# Patient Record
Sex: FEMALE | Race: WHITE | NOT HISPANIC OR LATINO | Employment: UNEMPLOYED | URBAN - METROPOLITAN AREA
[De-identification: names, ages, dates, MRNs, and addresses within clinical notes are randomized per-mention and may not be internally consistent; named-entity substitution may affect disease eponyms.]

---

## 2024-05-23 ENCOUNTER — APPOINTMENT (OUTPATIENT)
Dept: RADIOLOGY | Facility: HOSPITAL | Age: 54
End: 2024-05-23
Payer: COMMERCIAL

## 2024-05-23 PROCEDURE — 99284 EMERGENCY DEPT VISIT MOD MDM: CPT | Mod: 25

## 2024-05-23 PROCEDURE — 73110 X-RAY EXAM OF WRIST: CPT | Mod: LT

## 2024-05-23 PROCEDURE — 25605 CLTX DST RDL FX/EPHYS SEP W/: CPT | Performed by: EMERGENCY MEDICINE

## 2024-05-23 PROCEDURE — 73110 X-RAY EXAM OF WRIST: CPT | Mod: LEFT SIDE | Performed by: RADIOLOGY

## 2024-05-23 ASSESSMENT — COLUMBIA-SUICIDE SEVERITY RATING SCALE - C-SSRS
2. HAVE YOU ACTUALLY HAD ANY THOUGHTS OF KILLING YOURSELF?: NO
1. IN THE PAST MONTH, HAVE YOU WISHED YOU WERE DEAD OR WISHED YOU COULD GO TO SLEEP AND NOT WAKE UP?: NO
6. HAVE YOU EVER DONE ANYTHING, STARTED TO DO ANYTHING, OR PREPARED TO DO ANYTHING TO END YOUR LIFE?: NO

## 2024-05-23 ASSESSMENT — PAIN DESCRIPTION - LOCATION: LOCATION: WRIST

## 2024-05-23 ASSESSMENT — PAIN DESCRIPTION - ORIENTATION: ORIENTATION: LEFT

## 2024-05-23 ASSESSMENT — PAIN - FUNCTIONAL ASSESSMENT: PAIN_FUNCTIONAL_ASSESSMENT: 0-10

## 2024-05-23 ASSESSMENT — PAIN SCALES - GENERAL: PAINLEVEL_OUTOF10: 10 - WORST POSSIBLE PAIN

## 2024-05-24 ENCOUNTER — APPOINTMENT (OUTPATIENT)
Dept: ORTHOPEDIC SURGERY | Facility: HOSPITAL | Age: 54
End: 2024-05-24
Payer: COMMERCIAL

## 2024-05-24 ENCOUNTER — APPOINTMENT (OUTPATIENT)
Dept: RADIOLOGY | Facility: HOSPITAL | Age: 54
End: 2024-05-24
Payer: COMMERCIAL

## 2024-05-24 ENCOUNTER — HOSPITAL ENCOUNTER (EMERGENCY)
Facility: HOSPITAL | Age: 54
Discharge: HOME | End: 2024-05-24
Attending: EMERGENCY MEDICINE
Payer: COMMERCIAL

## 2024-05-24 VITALS
HEART RATE: 62 BPM | TEMPERATURE: 98.6 F | SYSTOLIC BLOOD PRESSURE: 97 MMHG | WEIGHT: 98 LBS | HEIGHT: 67 IN | DIASTOLIC BLOOD PRESSURE: 67 MMHG | OXYGEN SATURATION: 96 % | BODY MASS INDEX: 15.38 KG/M2 | RESPIRATION RATE: 16 BRPM

## 2024-05-24 DIAGNOSIS — S52.532A CLOSED COLLES' FRACTURE OF LEFT RADIUS, INITIAL ENCOUNTER: Primary | ICD-10-CM

## 2024-05-24 PROBLEM — F17.200 NICOTINE DEPENDENCE, UNSPECIFIED, UNCOMPLICATED: Status: ACTIVE | Noted: 2021-09-22

## 2024-05-24 PROBLEM — K63.2 ENTEROCUTANEOUS FISTULA: Status: ACTIVE | Noted: 2018-05-03

## 2024-05-24 PROBLEM — R79.89 ABNORMAL BLOOD CREATININE LEVEL: Status: ACTIVE | Noted: 2019-07-25

## 2024-05-24 PROBLEM — F33.9 MAJOR DEPRESSIVE DISORDER, RECURRENT, UNSPECIFIED (CMS-HCC): Chronic | Status: ACTIVE | Noted: 2021-09-22

## 2024-05-24 PROBLEM — N28.1 BILATERAL RENAL CYSTS: Status: ACTIVE | Noted: 2019-07-25

## 2024-05-24 PROBLEM — N80.00 UTERUS, ADENOMYOSIS: Status: ACTIVE | Noted: 2017-10-03

## 2024-05-24 PROBLEM — R19.5 POSITIVE FIT (FECAL IMMUNOCHEMICAL TEST): Status: ACTIVE | Noted: 2022-02-01

## 2024-05-24 PROBLEM — G43.909 MIGRAINE WITHOUT STATUS MIGRAINOSUS, NOT INTRACTABLE: Status: ACTIVE | Noted: 2019-07-25

## 2024-05-24 PROBLEM — F11.20 OPIOID DEPENDENCE ON AGONIST THERAPY (MULTI): Chronic | Status: ACTIVE | Noted: 2019-07-25

## 2024-05-24 PROBLEM — Z93.4: Status: ACTIVE | Noted: 2017-10-03

## 2024-05-24 PROBLEM — F17.200 SMOKING: Status: ACTIVE | Noted: 2018-10-15

## 2024-05-24 PROCEDURE — 2500000004 HC RX 250 GENERAL PHARMACY W/ HCPCS (ALT 636 FOR OP/ED): Mod: JZ | Performed by: PEDIATRICS

## 2024-05-24 PROCEDURE — 96372 THER/PROPH/DIAG INJ SC/IM: CPT | Performed by: PHYSICIAN ASSISTANT

## 2024-05-24 PROCEDURE — 73110 X-RAY EXAM OF WRIST: CPT | Mod: LT,76

## 2024-05-24 PROCEDURE — 2500000004 HC RX 250 GENERAL PHARMACY W/ HCPCS (ALT 636 FOR OP/ED): Performed by: PHYSICIAN ASSISTANT

## 2024-05-24 RX ORDER — KETOROLAC TROMETHAMINE 30 MG/ML
15 INJECTION, SOLUTION INTRAMUSCULAR; INTRAVENOUS ONCE
Status: COMPLETED | OUTPATIENT
Start: 2024-05-24 | End: 2024-05-24

## 2024-05-24 RX ORDER — BUPIVACAINE HYDROCHLORIDE 5 MG/ML
10 INJECTION, SOLUTION PERINEURAL ONCE
Status: DISCONTINUED | OUTPATIENT
Start: 2024-05-24 | End: 2024-05-24

## 2024-05-24 RX ORDER — HYDROMORPHONE HYDROCHLORIDE 1 MG/ML
1 INJECTION, SOLUTION INTRAMUSCULAR; INTRAVENOUS; SUBCUTANEOUS ONCE
Status: COMPLETED | OUTPATIENT
Start: 2024-05-24 | End: 2024-05-24

## 2024-05-24 RX ORDER — BUPIVACAINE HYDROCHLORIDE 5 MG/ML
10 INJECTION, SOLUTION EPIDURAL; INTRACAUDAL ONCE
Status: COMPLETED | OUTPATIENT
Start: 2024-05-24 | End: 2024-05-24

## 2024-05-24 RX ADMIN — BUPIVACAINE HYDROCHLORIDE 50 MG: 5 INJECTION, SOLUTION EPIDURAL; INTRACAUDAL; PERINEURAL at 02:45

## 2024-05-24 RX ADMIN — KETOROLAC TROMETHAMINE 15 MG: 30 INJECTION, SOLUTION INTRAMUSCULAR at 02:33

## 2024-05-24 RX ADMIN — HYDROMORPHONE HYDROCHLORIDE 1 MG: 1 INJECTION, SOLUTION INTRAMUSCULAR; INTRAVENOUS; SUBCUTANEOUS at 02:32

## 2024-05-24 ASSESSMENT — PAIN SCALES - GENERAL
PAINLEVEL_OUTOF10: 8
PAINLEVEL_OUTOF10: 10 - WORST POSSIBLE PAIN
PAINLEVEL_OUTOF10: 5 - MODERATE PAIN

## 2024-05-24 ASSESSMENT — PAIN - FUNCTIONAL ASSESSMENT: PAIN_FUNCTIONAL_ASSESSMENT: 0-10

## 2024-05-24 NOTE — ED PROVIDER NOTES
"Chief Complaint   Patient presents with    Wrist Injury     L wrist injury, fell at bottom of stairs, tried to catch self but ended up injuring L wrist     HPI:   Chloé Rojas is an 53 y.o. female with complicated PMH including ARNOLDO, bipolar, MDD, migraine disorder, fibromyalgia, gastroparesis presents to the ED for evaluation of left wrist pain.  Patient says she was going downstairs to do the laundry and there was a bird in her house.  She attempted to swat added with a towel in her hand and ended up slipping on a chair and falling into a concrete floor with her left wrist outstretched.  She did not hit her head or lose consciousness.  She is not on anticoagulants.  She is right-hand dominant.  She reports that she felt \"immediately warm pain\" radiating up her left wrist and arm.  Reports pain is 10/10.  She tried icing it without relief.  She denies any numbness, tingling, extremity weakness.  She is not on anticoagulants.  Denies any head pain, neck pain, back pain, dizziness or lightheadedness, chest pain, shortness of breath.    Medications: Keppra, Suboxone, Zofran, BuSpar, trazodone,, pending  Soc HX: Daily tobacco use, marijuana use  No Known Allergies: NKDA  History reviewed. No pertinent past medical history.  History reviewed. No pertinent surgical history.  No family history on file.     Physical Exam  Vitals and nursing note reviewed.   Constitutional:       General: She is not in acute distress.     Appearance: She is not ill-appearing or toxic-appearing.      Comments: Tearful, cachectic.   HENT:      Right Ear: External ear normal.      Left Ear: External ear normal.   Eyes:      Pupils: Pupils are equal, round, and reactive to light.   Cardiovascular:      Rate and Rhythm: Normal rate and regular rhythm.      Pulses: Normal pulses.      Heart sounds: Normal heart sounds.   Pulmonary:      Effort: Pulmonary effort is normal.      Breath sounds: Normal breath sounds.   Musculoskeletal:      Cervical " back: Normal range of motion. No tenderness.      Comments: L UE: Deformity of left wrist.  2+ radial pulse.  Normal cap refill.  Normal active ROM of digits.  No tenderness with palpation of proximal forearm.  Normal ROM of elbow and shoulder.   Skin:     General: Skin is warm and dry.      Capillary Refill: Capillary refill takes less than 2 seconds.   Neurological:      General: No focal deficit present.      Mental Status: She is alert.      Cranial Nerves: No cranial nerve deficit.      Sensory: No sensory deficit.     VS: As documented in the triage note and EMR flowsheet from this visit were reviewed.      Medical Decision Making:   ED Course as of 05/26/24 0122   Fri May 24, 2024   0016 Vitals Reviewed: Afebrile. Normotensive. Tachycardic and tachypneic. No hypoxia.   [KA]   0018 Sent secure images of x-rays to orthopedist on-call.  Would like input as to whether we need to attempt reduction or whether patient is safe to be placed in splint and follow-up on outpatient basis with Ortho. [KA]   0118 Patient is 53-year-old female presents to the ED for evaluation of left wrist pain after sustaining a fall on outstretched arm.  She has obvious deformity of the left wrist.  [place] head CT and [facility] do not recommend imaging.  Reviewed x-ray imaging and there is displaced, impacted fracture of the distal radius and appears to be a avulsion fracture of the distal ulna.  I already sent images to the orthopedist on-call and he recommends reduction.  Patient to be given IM Dilaudid and IM Toradol.  Will undergo reduction and splinting. [KA]   0325 Patient underwent reduction and splinting by COREEN Rosa.  She tolerated well.  I reviewed postreduction images and appears to be in better anatomic alignment. [KA]   0500 Provided reassurance to patient.  Recommended follow-up with orthopedics.  Encouraged to continue Tylenol and/or ibuprofen as needed for pain.  Encouraged to return to the ER for any new or  worsening pain, numbness, tingling, finger discoloration.  Patient agreeable. [KA]      ED Course User Index  [KA] Annemarie Doll PA-C         Diagnoses as of 05/26/24 0122   Closed Colles' fracture of left radius, initial encounter     Procedures      Escalation of Care: Appropriate for outpatient management       Discussion of Management with Other Providers:  I discussed the patient/results with: Attending [facility]    Counseling: Spoke with the patient and discussed today´s findings, in addition to providing specific details for the plan of care and expected course.  Patient was given the opportunity to ask questions.    Discussed return precautions and importance of follow-up.  Advised to follow-up with orthopedics.  Advised to return to the ED for changing or worsening symptoms, new symptoms, complaint specific precautions, and precautions listed on the discharge paperwork.  Educated on the common potential side effects of medications prescribed.    I advised the patient that the emergency evaluation and treatment provided today doesn't end their need for medical care. It is very important that they follow-up with their primary care provider or other specialist as instructed.    The plan of care was mutually agreed upon with the patient. The patient and/or family were given the opportunity to ask questions. All questions asked today in the ED were answered to the best of my ability with today's information.    I specifically advised the patient to return to the ED for changing or worsening symptoms, worrisome new symptoms, or for any complaint specific precautions listed on the discharge paperwork.    This patient was cared for in the setting of nationwide stress on resources and staffing.    This report was transcribed using voice recognition software.  Every effort was made to ensure accuracy, however, inadvertently computerized transcription errors may be present.       Annemarie Doll PA-C  05/26/24  [id number]

## 2024-05-24 NOTE — DISCHARGE INSTRUCTIONS
Please continue Tylenol and/or ibuprofen as needed for pain.  Follow-up with orthopedics.  If you cannot get into see orthopedic doctor in a timely fashion may present to orthopedic injury clinic for further evaluation.  Return to ER for any new or worsening symptoms.     orthopedic injury clinic   [facility] [place]  [address]  Second floor [place] [place] [address]  [phone]  Open for walk-ins Monday through Friday 8:30 AM through 4 PM  open 10 AM to 2 PM Christmas liberty and New Year's liberty  Closed on Jake day and New Year's day

## 2024-05-27 NOTE — ED PROCEDURE NOTE
Procedure  Orthopaedic Injury Treatment - Fracture    Performed by: Andrés Rosa MD  Authorized by: Andrés Rosa MD    Consent:     Consent obtained:  Verbal    Consent given by:  Patient    Risks, benefits, and alternatives were discussed: yes      Risks discussed:  Pain    Alternatives discussed:  Delayed treatment, observation and referral  Universal protocol:     Procedure explained and questions answered to patient or proxy's satisfaction: yes      Test results available: yes      Required blood products, implants, devices, and special equipment available: yes      Immediately prior to procedure, a time out was called: yes      Patient identity confirmed:  Verbally with patient  Injury:     Injury location:  Forearm    Forearm injury location:  L forearm    Forearm fracture type: distal radius and ulnar styloid    Pre-procedure details:     Distal neurologic exam:  Normal    Distal perfusion: distal pulses strong      Range of motion: reduced    Sedation:     Sedation type:  None  Anesthesia:     Anesthesia method:  Local infiltration    Local anesthetic:  Bupivacaine 0.5% w/o epi  Procedure details:     Manipulation performed: yes      Skin traction used: yes      Pin inserted: no      Reduction successful: yes      X-ray confirmed reduction: yes      Immobilization:  Splint    Splint type:  Thumb spica    Supplies used:  Elastic bandage, cotton padding and fiberglass    Attestation: Splint applied and adjusted personally by me    Post-procedure details:     Distal neurologic exam:  Normal    Distal perfusion: distal pulses strong      Range of motion: not applicable      Procedure completion:  Tolerated               Andrés Rosa MD  05/26/24 [id number]

## 2024-05-31 ENCOUNTER — OFFICE VISIT (OUTPATIENT)
Dept: ORTHOPEDIC SURGERY | Facility: HOSPITAL | Age: 54
End: 2024-05-31
Payer: COMMERCIAL

## 2024-05-31 DIAGNOSIS — S52.602A TRAUMATIC CLOSED DISPLACED FRACTURE OF DISTAL END OF LEFT RADIUS AND ULNA, INITIAL ENCOUNTER: Primary | ICD-10-CM

## 2024-05-31 DIAGNOSIS — S52.502A TRAUMATIC CLOSED DISPLACED FRACTURE OF DISTAL END OF LEFT RADIUS AND ULNA, INITIAL ENCOUNTER: Primary | ICD-10-CM

## 2024-05-31 PROCEDURE — 1036F TOBACCO NON-USER: CPT | Performed by: ORTHOPAEDIC SURGERY

## 2024-05-31 PROCEDURE — 99214 OFFICE O/P EST MOD 30 MIN: CPT | Performed by: ORTHOPAEDIC SURGERY

## 2024-05-31 PROCEDURE — 29075 APPL CST ELBW FNGR SHORT ARM: CPT | Performed by: ORTHOPAEDIC SURGERY

## 2024-05-31 PROCEDURE — 99204 OFFICE O/P NEW MOD 45 MIN: CPT | Performed by: ORTHOPAEDIC SURGERY

## 2024-05-31 NOTE — PROGRESS NOTES
CHIEF COMPLAINT         Left wrist pain    ASSESSMENT + PLAN    Extra-articular left distal radius Colles' fracture reduced from 20 degrees to 0 degrees dorsal tilt with large associated ulnar styloid fracture involving the fovea    I reviewed the nature of the injury and the expected time course of healing from here along with the expectation of mild cosmetic deformity at the wrist.  I discussed the relative trade-offs between continued nonoperative management and excepting the deformity versus going to the OR for surgical reduction and fixation.  We agreed on the nonoperative route and a fiberglass short arm cast was applied today, in the safe position.  Keep this clean, dry, and in place for the next 5 weeks.  Follow-up at that point for cast removal with new AP, lateral, oblique x-rays left wrist out of cast.    Contact my office in the meantime with any interval concerns.        HISTORY OF PRESENT ILLNESS       Patient is a 53 y.o. right-hand dominant female, who presents today for evaluation of a left wrist fracture.  This occurred on April 23 when she fell while taking out the trash, landing on outstretched left wrist.  She had immediate pain, swelling, deformity and went to [facility] where a distal radius fracture was identified, closed reduced, and splinted.  She presents today for first orthopedic evaluation.  Pain decreasing appropriately.  No numbness or tingling.  She does not have a history of significant injury to this wrist in the past.    She is not diabetic or hypothyroid.  She does smoke up to a pack a day.  She has history of fibromyalgia, bipolar, MDD.      REVIEW OF SYSTEMS       A 30-item multi-system Review Of Systems was obtained on today's intake form.  This was reviewed with the patient and is correct.  The pertinent positives and negatives are listed above.  The form has been scanned separately into the medical record.      PHYSICAL EXAM    Constitutional:    Appears stated age.  Well-developed and well-nourished morbidly obese female in no acute distress.  Psychiatric:         Pleasant normal mood and affect. Behavior is appropriate for the situation.   Head:                   Normocephalic and atraumatic.  Eyes:                    Pupils are equal and round.  Cardiovascular:  2+ radial and ulnar pulses. Fingers well-perfused.  Respiratory:        Effort normal. No respiratory distress. Speaking in complete sentences.  Neurologic:       Alert and oriented to person, place, and time.  Skin:                Skin is intact, warm and dry.  Hematologic / Lymphatic:    No lymphedema or lymphangitis.    Extremities / Musculoskeletal:                      After reviewing the films and discussing the options, the splint was removed.  Skin intact.  Moderate palmar ecchymosis and mild dorsal swelling.  Minimal dinner fork deformity.  Good composite finger flexion extension with intact intrinsic plus minus posture.  Tendons intact by individual testing.  Sensation intact to light touch in all distributions.  Capillary refill less than 2 seconds.      IMAGING / LABS / EMGs           X-rays left wrist from May 23 and 24 at [facility] were independently interpreted by me today.  There is an extra-articular left distal radius fracture in 20 degrees dorsal tilt with subsequent reduction to neutral tilt.  Good maintenance of radial height and length.  Large associated ulnar styloid fracture involving part of the fovea.      No past medical history on file.    Medication Documentation Review Audit       Reviewed by Annemarie Doll PA-C (Physician Assistant) on 05/24/24 at 0019      Medication Order Taking? Sig Documenting Provider Last Dose Status            No Medications to Display                                   No Known Allergies    Social History     Socioeconomic History    Marital status:      Spouse name: Not on file    Number of children: Not on file    Years of education: Not on file    Highest  education level: Not on file   Occupational History    Not on file   Tobacco Use    Smoking status: Not on file    Smokeless tobacco: Not on file   Substance and Sexual Activity    Alcohol use: Not on file    Drug use: Not on file    Sexual activity: Not on file   Other Topics Concern    Not on file   Social History Narrative    Not on file     Social Determinants of Health     Financial Resource Strain: Low Risk  (1/16/2023)    Received from [facility]    Overall Financial Resource Strain (CARDIA)     Difficulty of Paying Living Expenses: Not very hard   Food Insecurity: Food Insecurity Present (1/16/2023)    Received from [facility]    Hunger Vital Sign     Worried About Running Out of Food in the Last Year: Sometimes true     Ran Out of Food in the Last Year: Sometimes true   Transportation Needs: Unmet Transportation Needs (1/16/2023)    Received from [facility] [facility]    PRAPARE - Transportation     Lack of Transportation (Medical): Yes     Lack of Transportation (Non-Medical): Yes   Physical Activity: Inactive (1/16/2023)    Received from [facility] [facility]    Exercise Vital Sign     Days of Exercise per Week: 0 days     Minutes of Exercise per Session: 0 min   Stress: Stress Concern Present (1/16/2023)    Received from [facility]    [employer] [place] [employer] - Occupational Stress Questionnaire     Feeling of Stress : Rather much   Social Connections: Moderately Integrated (1/16/2023)    Received from [facility]    Social Connection and Isolation Panel [NHANES]     Frequency of Communication with Friends and Family: More than three times a week     Frequency of Social Gatherings with Friends and Family: Twice a week     Attends Druze Services: 1 to 4 times per year     Active Member of Clubs or Organizations: Yes     Attends Club or Organization Meetings: More than 4 times per year     Marital Status:    Intimate Partner Violence: Not At Risk (1/16/2023)    Received from [employer] [facility]     Humiliation, Afraid, Rape, and Kick questionnaire     Fear of Current or Ex-Partner: No     Emotionally Abused: No     Physically Abused: No     Sexually Abused: No   Housing Stability: Low Risk  (1/16/2023)    Received from [facility] [place]    Housing Stability Vital Sign     Unable to Pay for Housing in the Last Year: No     Number of Places Lived in the Last Year: 1     Unstable Housing in the Last Year: No       No past surgical history on file.      Electronically Signed      JAIMIE Leavitt MD      Orthopaedic Hand Surgery      [phone]

## 2024-05-31 NOTE — LETTER
Falguni 3, 2024       No Recipients    Patient: Chloé Rojas   YOB: 1970   Date of Visit: 5/31/2024       Dear Dr. Galvan Recipients:    Thank you for referring Chloé Rojas to me for evaluation. Below are my notes for this consultation.  If you have questions, please do not hesitate to call me. I look forward to following your patient along with you.       Sincerely,     Jasmeet Leavitt MD      CC:   No Recipients  ______________________________________________________________________________________    CHIEF COMPLAINT         Left wrist pain    ASSESSMENT + PLAN    Extra-articular left distal radius Colles' fracture reduced from 20 degrees to 0 degrees dorsal tilt with large associated ulnar styloid fracture involving the fovea    I reviewed the nature of the injury and the expected time course of healing from here along with the expectation of mild cosmetic deformity at the wrist.  I discussed the relative trade-offs between continued nonoperative management and excepting the deformity versus going to the OR for surgical reduction and fixation.  We agreed on the nonoperative route and a fiberglass short arm cast was applied today, in the safe position.  Keep this clean, dry, and in place for the next 5 weeks.  Follow-up at that point for cast removal with new AP, lateral, oblique x-rays left wrist out of cast.    Contact my office in the meantime with any interval concerns.        HISTORY OF PRESENT ILLNESS       Patient is a 53 y.o. right-hand dominant female, who presents today for evaluation of a left wrist fracture.  This occurred on April 23 when she fell while taking out the trash, landing on outstretched left wrist.  She had immediate pain, swelling, deformity and went to [facility] where a distal radius fracture was identified, closed reduced, and splinted.  She presents today for first orthopedic evaluation.  Pain decreasing appropriately.  No numbness or tingling.  She does not have a history  of significant injury to this wrist in the past.    She is not diabetic or hypothyroid.  She does smoke up to a pack a day.  She has history of fibromyalgia, bipolar, MDD.      REVIEW OF SYSTEMS       A 30-item multi-system Review Of Systems was obtained on today's intake form.  This was reviewed with the patient and is correct.  The pertinent positives and negatives are listed above.  The form has been scanned separately into the medical record.      PHYSICAL EXAM    Constitutional:    Appears stated age. Well-developed and well-nourished morbidly obese female in no acute distress.  Psychiatric:         Pleasant normal mood and affect. Behavior is appropriate for the situation.   Head:                   Normocephalic and atraumatic.  Eyes:                    Pupils are equal and round.  Cardiovascular:  2+ radial and ulnar pulses. Fingers well-perfused.  Respiratory:        Effort normal. No respiratory distress. Speaking in complete sentences.  Neurologic:       Alert and oriented to person, place, and time.  Skin:                Skin is intact, warm and dry.  Hematologic / Lymphatic:    No lymphedema or lymphangitis.    Extremities / Musculoskeletal:                      After reviewing the films and discussing the options, the splint was removed.  Skin intact.  Moderate palmar ecchymosis and mild dorsal swelling.  Minimal dinner fork deformity.  Good composite finger flexion extension with intact intrinsic plus minus posture.  Tendons intact by individual testing.  Sensation intact to light touch in all distributions.  Capillary refill less than 2 seconds.      IMAGING / LABS / EMGs           X-rays left wrist from May 23 and 24 at [facility] were independently interpreted by me today.  There is an extra-articular left distal radius fracture in 20 degrees dorsal tilt with subsequent reduction to neutral tilt.  Good maintenance of radial height and length.  Large associated ulnar styloid fracture involving part of the  fovea.      No past medical history on file.    Medication Documentation Review Audit       Reviewed by Annemarie Doll PA-C (Physician Assistant) on 05/24/24 at 0019      Medication Order Taking? Sig Documenting Provider Last Dose Status            No Medications to Display                                   No Known Allergies    Social History     Socioeconomic History   • Marital status:      Spouse name: Not on file   • Number of children: Not on file   • Years of education: Not on file   • Highest education level: Not on file   Occupational History   • Not on file   Tobacco Use   • Smoking status: Not on file   • Smokeless tobacco: Not on file   Substance and Sexual Activity   • Alcohol use: Not on file   • Drug use: Not on file   • Sexual activity: Not on file   Other Topics Concern   • Not on file   Social History Narrative   • Not on file     Social Determinants of Health     Financial Resource Strain: Low Risk  (1/16/2023)    Received from [employer]    Overall Financial Resource Strain (CARDIA)    • Difficulty of Paying Living Expenses: Not very hard   Food Insecurity: Food Insecurity Present (1/16/2023)    Received from [employer]    Hunger Vital Sign    • Worried About Running Out of Food in the Last Year: Sometimes true    • Ran Out of Food in the Last Year: Sometimes true   Transportation Needs: Unmet Transportation Needs (1/16/2023)    Received from [employer]    PRAPARE - Transportation    • Lack of Transportation (Medical): Yes    • Lack of Transportation (Non-Medical): Yes   Physical Activity: Inactive (1/16/2023)    Received from [facility]    Exercise Vital Sign    • Days of Exercise per Week: 0 days    • Minutes of Exercise per Session: 0 min   Stress: Stress Concern Present (1/16/2023)    Received from [facility]    [employer] [place] [employer] - Occupational Stress Questionnaire    • Feeling of Stress : Rather much   Social Connections: Moderately Integrated  (1/16/2023)    Received from [employer]    Social Connection and Isolation Panel [NHANES]    • Frequency of Communication with Friends and Family: More than three times a week    • Frequency of Social Gatherings with Friends and Family: Twice a week    • Attends Tenriism Services: 1 to 4 times per year    • Active Member of Clubs or Organizations: Yes    • Attends Club or Organization Meetings: More than 4 times per year    • Marital Status:    Intimate Partner Violence: Not At Risk (1/16/2023)    Received from [employer]    Humiliation, Afraid, Rape, and Kick questionnaire    • Fear of Current or Ex-Partner: No    • Emotionally Abused: No    • Physically Abused: No    • Sexually Abused: No   Housing Stability: Low Risk  (1/16/2023)    Received from [employer]    Housing Stability Vital Sign    • Unable to Pay for Housing in the Last Year: No    • Number of Places Lived in the Last Year: 1    • Unstable Housing in the Last Year: No       No past surgical history on file.      Electronically Signed      JAIMIE Leavitt MD      Orthopaedic Hand Surgery      [phone]

## 2024-06-03 PROBLEM — S52.602A TRAUMATIC CLOSED DISPLACED FRACTURE OF DISTAL END OF LEFT RADIUS AND ULNA: Status: ACTIVE | Noted: 2024-06-03

## 2024-06-03 PROBLEM — S52.502A TRAUMATIC CLOSED DISPLACED FRACTURE OF DISTAL END OF LEFT RADIUS AND ULNA: Status: ACTIVE | Noted: 2024-06-03

## 2024-07-03 ENCOUNTER — APPOINTMENT (OUTPATIENT)
Dept: RADIOLOGY | Facility: HOSPITAL | Age: 54
End: 2024-07-03
Payer: COMMERCIAL

## 2024-07-12 ENCOUNTER — OFFICE VISIT (OUTPATIENT)
Dept: ORTHOPEDIC SURGERY | Facility: HOSPITAL | Age: 54
End: 2024-07-12
Payer: COMMERCIAL

## 2024-07-12 ENCOUNTER — HOSPITAL ENCOUNTER (OUTPATIENT)
Dept: RADIOLOGY | Facility: HOSPITAL | Age: 54
Discharge: HOME | End: 2024-07-12
Payer: COMMERCIAL

## 2024-07-12 DIAGNOSIS — S52.502P: ICD-10-CM

## 2024-07-12 DIAGNOSIS — S52.502D TRAUMATIC CLOSED DISPLACED FRACTURE OF DISTAL END OF LEFT RADIUS AND ULNA WITH ROUTINE HEALING, SUBSEQUENT ENCOUNTER: ICD-10-CM

## 2024-07-12 DIAGNOSIS — S52.602D TRAUMATIC CLOSED DISPLACED FRACTURE OF DISTAL END OF LEFT RADIUS AND ULNA WITH ROUTINE HEALING, SUBSEQUENT ENCOUNTER: ICD-10-CM

## 2024-07-12 PROCEDURE — 73110 X-RAY EXAM OF WRIST: CPT | Mod: LT

## 2024-07-12 PROCEDURE — 99214 OFFICE O/P EST MOD 30 MIN: CPT | Performed by: ORTHOPAEDIC SURGERY

## 2024-07-12 PROCEDURE — 1036F TOBACCO NON-USER: CPT | Performed by: ORTHOPAEDIC SURGERY

## 2024-07-12 NOTE — PROGRESS NOTES
CHIEF COMPLAINT         Left wrist f/u    ASSESSMENT + PLAN    Left distal radius impending 40 degree malunion    Unfortunately, the fracture has settled back in a cast and to the current position.  I pointed out how the angulation of the bone is producing the visible deformity of the wrist.  We discussed options for management and agreed on proceeding with surgical osteotomy and volar plating.  I reviewed the major risks of that procedure.  This would be done under general anesthetic and a block at the location of your convenience.  Contact my office to set up an exact surgical date.    A new volar removable wrist slab splint was provided for comfort in the meantime.    HISTORY OF PRESENT ILLNESS       Patient returns today, as directed, 5 weeks after last visit in a cast for a left extra-articular distal radius fracture reduced from 20 degrees to 0 degrees of dorsal tilt.  She reports appropriately decreasing pain and no numbness or tingling.  No interval trauma.      PHYSICAL EXAM       She remains well-developed, quite thin female in no acute distress.  She appears her stated age and has a pleasant affect.  Splint removed.  Skin intact.  Significant dinner fork deformity of the wrist.  No tenderness at the fracture site.  Moderate prominence of the ulnar head as well.  Full composite finger flexion extension and good thumb opposition to station 9.  Sensation intact to light touch in all distributions.  Capillary refill less than 2 seconds.  2+ radial and ulnar pulses.      IMAGING / LABS / EMGs    X-rays left wrist ordered and independently interpreted by me today confirm a little longitudinal collapse and significant dorsal tilt collapse of the distal radius, now measuring about 40 degrees and leaving her about 4 mm ulnar positive.  Joints are otherwise concentric and well-preserved.  Good callus formation at the fracture.    SURGICAL INDICATION    I reviewed the options for further management of this condition  and the likely success rates of each.  The patient feels that they have maximized the benefits of conservative care, and they do want to go on to surgery.    I reviewed the major risks of surgery including infection; scarring; damage to nerves, tendons, or vessels; stiffness; nonunion, malunion, hardware site issues, progressive arthritis, and wound healing problems, as well as anesthesia risks.  I answered their questions to their satisfaction.  They were given my contact information and will contact the office when they are ready to schedule an exact surgical date.  Surgery will be posted as follows :    Dx :          Left distal radius extra-articular malunion  ICD-10 :      s52.502p  Procedure :     Corrective osteotomy and volar plating of left distal radius extra-articular malunion  CPT :        35914  Anesth :    General plus block  [facility] :   [facility]  Duration :    2 hours  Specials :    Synthes variable angle distal radius set, mini C arm  PAT :       No  Post-Op Visit :    10-15 days        Electronically Signed      JAIMIE Leavitt MD      Orthopaedic Hand Surgery      [phone]

## 2024-07-12 NOTE — LETTER
July 15, 2024     No Recipients    Patient: Chloé Rojas   YOB: 1970   Date of Visit: 7/12/2024       Dear Dr. Galvan Recipients:    Thank you for referring Chloé Rojas to me for evaluation. Below are my notes for this consultation.  If you have questions, please do not hesitate to call me. I look forward to following your patient along with you.       Sincerely,     Jasmeet Leavitt MD      CC: No Recipients  ______________________________________________________________________________________    CHIEF COMPLAINT         Left wrist f/u    ASSESSMENT + PLAN    Left distal radius impending 40 degree malunion    Unfortunately, the fracture has settled back in a cast and to the current position.  I pointed out how the angulation of the bone is producing the visible deformity of the wrist.  We discussed options for management and agreed on proceeding with surgical osteotomy and volar plating.  I reviewed the major risks of that procedure.  This would be done under general anesthetic and a block at the location of your convenience.  Contact my office to set up an exact surgical date.    A new volar removable wrist slab splint was provided for comfort in the meantime.    HISTORY OF PRESENT ILLNESS       Patient returns today, as directed, 5 weeks after last visit in a cast for a left extra-articular distal radius fracture reduced from 20 degrees to 0 degrees of dorsal tilt.  She reports appropriately decreasing pain and no numbness or tingling.  No interval trauma.      PHYSICAL EXAM       She remains well-developed, quite thin female in no acute distress.  She appears her stated age and has a pleasant affect.  Splint removed.  Skin intact.  Significant dinner fork deformity of the wrist.  No tenderness at the fracture site.  Moderate prominence of the ulnar head as well.  Full composite finger flexion extension and good thumb opposition to station 9.  Sensation intact to light touch in all distributions.   Capillary refill less than 2 seconds.  2+ radial and ulnar pulses.      IMAGING / LABS / EMGs    X-rays left wrist ordered and independently interpreted by me today confirm a little longitudinal collapse and significant dorsal tilt collapse of the distal radius, now measuring about 40 degrees and leaving her about 4 mm ulnar positive.  Joints are otherwise concentric and well-preserved.  Good callus formation at the fracture.    SURGICAL INDICATION    I reviewed the options for further management of this condition and the likely success rates of each.  The patient feels that they have maximized the benefits of conservative care, and they do want to go on to surgery.    I reviewed the major risks of surgery including infection; scarring; damage to nerves, tendons, or vessels; stiffness; nonunion, malunion, hardware site issues, progressive arthritis, and wound healing problems, as well as anesthesia risks.  I answered their questions to their satisfaction.  They were given my contact information and will contact the office when they are ready to schedule an exact surgical date.  Surgery will be posted as follows :    Dx :          Left distal radius extra-articular malunion  ICD-10 :      s52.502p  Procedure :     Corrective osteotomy and volar plating of left distal radius extra-articular malunion  CPT :        83846  Anesth :    General plus block  [facility] :   [facility]  Duration :    2 hours  Specials :    Synthes variable angle distal radius set, mini C arm  PAT :       No  Post-Op Visit :    10-15 days        Electronically Signed      JAIMIE Leavitt MD      Orthopaedic Hand Surgery      [phone]

## 2024-07-15 DIAGNOSIS — S52.502P: ICD-10-CM

## 2024-07-18 RX ORDER — SUMATRIPTAN 50 MG/1
50 TABLET, FILM COATED ORAL
COMMUNITY
Start: 2022-09-26 | End: 2024-07-18 | Stop reason: ALTCHOICE

## 2024-07-18 RX ORDER — ONDANSETRON 4 MG/1
4 TABLET, FILM COATED ORAL EVERY 8 HOURS PRN
COMMUNITY

## 2024-07-18 RX ORDER — MIRTAZAPINE 15 MG/1
TABLET, FILM COATED ORAL
COMMUNITY
End: 2024-07-18 | Stop reason: ALTCHOICE

## 2024-07-18 RX ORDER — DULOXETIN HYDROCHLORIDE 60 MG/1
120 CAPSULE, DELAYED RELEASE ORAL
COMMUNITY
Start: 2024-06-20

## 2024-07-18 RX ORDER — LEVETIRACETAM 500 MG/1
500 TABLET ORAL 4 TIMES DAILY
COMMUNITY
Start: 2024-07-03 | End: 2024-10-06

## 2024-07-18 RX ORDER — BUSPIRONE HYDROCHLORIDE 15 MG/1
15 TABLET ORAL 3 TIMES DAILY
COMMUNITY
Start: 2024-06-20

## 2024-07-18 RX ORDER — TRAZODONE HYDROCHLORIDE 150 MG/1
150-300 TABLET ORAL
COMMUNITY
Start: 2024-06-20

## 2024-07-18 RX ORDER — GABAPENTIN 300 MG/1
CAPSULE ORAL
COMMUNITY
End: 2024-07-18 | Stop reason: ALTCHOICE

## 2024-07-18 RX ORDER — CLONIDINE HYDROCHLORIDE 0.1 MG/1
0.1 TABLET ORAL 3 TIMES DAILY PRN
COMMUNITY
Start: 2024-06-20

## 2024-07-18 RX ORDER — BUPRENORPHINE AND NALOXONE 8; 2 MG/1; MG/1
1 FILM, SOLUBLE BUCCAL; SUBLINGUAL 2 TIMES DAILY
COMMUNITY
Start: 2024-06-20

## 2024-07-28 RX ORDER — CEFAZOLIN SODIUM 2 G/100ML
2 INJECTION, SOLUTION INTRAVENOUS ONCE
Status: CANCELLED | OUTPATIENT
Start: 2024-07-28 | End: 2024-07-28

## 2024-07-29 ENCOUNTER — ANESTHESIA EVENT (OUTPATIENT)
Dept: OPERATING ROOM | Facility: CLINIC | Age: 54
End: 2024-07-29
Payer: COMMERCIAL

## 2024-07-30 ENCOUNTER — ANESTHESIA (OUTPATIENT)
Dept: OPERATING ROOM | Facility: CLINIC | Age: 54
End: 2024-07-30
Payer: COMMERCIAL

## 2024-07-30 ENCOUNTER — HOSPITAL ENCOUNTER (OUTPATIENT)
Facility: CLINIC | Age: 54
Setting detail: OUTPATIENT SURGERY
Discharge: HOME | End: 2024-07-30
Attending: ORTHOPAEDIC SURGERY | Admitting: ORTHOPAEDIC SURGERY
Payer: COMMERCIAL

## 2024-07-30 ENCOUNTER — HOSPITAL ENCOUNTER (OUTPATIENT)
Dept: RADIOLOGY | Facility: CLINIC | Age: 54
Discharge: HOME | End: 2024-07-30
Payer: COMMERCIAL

## 2024-07-30 VITALS
OXYGEN SATURATION: 99 % | WEIGHT: 96.34 LBS | HEIGHT: 67 IN | RESPIRATION RATE: 16 BRPM | TEMPERATURE: 97.7 F | BODY MASS INDEX: 15.12 KG/M2 | SYSTOLIC BLOOD PRESSURE: 132 MMHG | DIASTOLIC BLOOD PRESSURE: 88 MMHG | HEART RATE: 73 BPM

## 2024-07-30 DIAGNOSIS — G89.18 POSTOPERATIVE PAIN: Primary | ICD-10-CM

## 2024-07-30 DIAGNOSIS — S52.502P: ICD-10-CM

## 2024-07-30 PROCEDURE — 2500000001 HC RX 250 WO HCPCS SELF ADMINISTERED DRUGS (ALT 637 FOR MEDICARE OP): Performed by: ORTHOPAEDIC SURGERY

## 2024-07-30 PROCEDURE — 25350 REVISION OF RADIUS: CPT | Performed by: ORTHOPAEDIC SURGERY

## 2024-07-30 PROCEDURE — 2500000004 HC RX 250 GENERAL PHARMACY W/ HCPCS (ALT 636 FOR OP/ED): Mod: JG | Performed by: ORTHOPAEDIC SURGERY

## 2024-07-30 PROCEDURE — A25350: Performed by: ANESTHESIOLOGIST ASSISTANT

## 2024-07-30 PROCEDURE — 3600000003 HC OR TIME - INITIAL BASE CHARGE - PROCEDURE LEVEL THREE: Performed by: ORTHOPAEDIC SURGERY

## 2024-07-30 PROCEDURE — 2720000007 HC OR 272 NO HCPCS: Performed by: ORTHOPAEDIC SURGERY

## 2024-07-30 PROCEDURE — C1713 ANCHOR/SCREW BN/BN,TIS/BN: HCPCS | Performed by: ORTHOPAEDIC SURGERY

## 2024-07-30 PROCEDURE — 3700000002 HC GENERAL ANESTHESIA TIME - EACH INCREMENTAL 1 MINUTE: Performed by: ORTHOPAEDIC SURGERY

## 2024-07-30 PROCEDURE — 2500000005 HC RX 250 GENERAL PHARMACY W/O HCPCS: Performed by: ORTHOPAEDIC SURGERY

## 2024-07-30 PROCEDURE — 2500000002 HC RX 250 W HCPCS SELF ADMINISTERED DRUGS (ALT 637 FOR MEDICARE OP, ALT 636 FOR OP/ED): Performed by: STUDENT IN AN ORGANIZED HEALTH CARE EDUCATION/TRAINING PROGRAM

## 2024-07-30 PROCEDURE — A25350: Performed by: ANESTHESIOLOGY

## 2024-07-30 PROCEDURE — 7100000009 HC PHASE TWO TIME - INITIAL BASE CHARGE: Performed by: ORTHOPAEDIC SURGERY

## 2024-07-30 PROCEDURE — 7100000010 HC PHASE TWO TIME - EACH INCREMENTAL 1 MINUTE: Performed by: ORTHOPAEDIC SURGERY

## 2024-07-30 PROCEDURE — 7100000001 HC RECOVERY ROOM TIME - INITIAL BASE CHARGE: Performed by: ORTHOPAEDIC SURGERY

## 2024-07-30 PROCEDURE — 3600000008 HC OR TIME - EACH INCREMENTAL 1 MINUTE - PROCEDURE LEVEL THREE: Performed by: ORTHOPAEDIC SURGERY

## 2024-07-30 PROCEDURE — 3700000001 HC GENERAL ANESTHESIA TIME - INITIAL BASE CHARGE: Performed by: ORTHOPAEDIC SURGERY

## 2024-07-30 PROCEDURE — 2500000005 HC RX 250 GENERAL PHARMACY W/O HCPCS: Performed by: ANESTHESIOLOGIST ASSISTANT

## 2024-07-30 PROCEDURE — 7100000002 HC RECOVERY ROOM TIME - EACH INCREMENTAL 1 MINUTE: Performed by: ORTHOPAEDIC SURGERY

## 2024-07-30 PROCEDURE — 2780000003 HC OR 278 NO HCPCS: Performed by: ORTHOPAEDIC SURGERY

## 2024-07-30 PROCEDURE — 2500000004 HC RX 250 GENERAL PHARMACY W/ HCPCS (ALT 636 FOR OP/ED): Performed by: ANESTHESIOLOGIST ASSISTANT

## 2024-07-30 PROCEDURE — 64415 NJX AA&/STRD BRCH PLXS IMG: CPT | Performed by: ANESTHESIOLOGY

## 2024-07-30 PROCEDURE — 2500000004 HC RX 250 GENERAL PHARMACY W/ HCPCS (ALT 636 FOR OP/ED): Performed by: STUDENT IN AN ORGANIZED HEALTH CARE EDUCATION/TRAINING PROGRAM

## 2024-07-30 DEVICE — SCREW, CORTEX SELF, 2.7MM X 12MM: Type: IMPLANTABLE DEVICE | Site: WRIST | Status: FUNCTIONAL

## 2024-07-30 DEVICE — SCREW, VA 2.4 X 20 LOCK STARDRIVE: Type: IMPLANTABLE DEVICE | Site: WRIST | Status: FUNCTIONAL

## 2024-07-30 DEVICE — PLATE, RADIUS DIST 6H/3H LT VA-LCP 2-COL NRW: Type: IMPLANTABLE DEVICE | Site: WRIST | Status: FUNCTIONAL

## 2024-07-30 DEVICE — SCREW, CORTEX SELF TAP W/T8 RECESS 2.7MM X 14MM, SS: Type: IMPLANTABLE DEVICE | Site: WRIST | Status: FUNCTIONAL

## 2024-07-30 DEVICE — SCREW, VA 2.4 X 16 LOCK STARDRIVE: Type: IMPLANTABLE DEVICE | Site: WRIST | Status: FUNCTIONAL

## 2024-07-30 RX ORDER — NORETHINDRONE AND ETHINYL ESTRADIOL 0.5-0.035
KIT ORAL AS NEEDED
Status: DISCONTINUED | OUTPATIENT
Start: 2024-07-30 | End: 2024-07-30

## 2024-07-30 RX ORDER — PHENYLEPHRINE HCL IN 0.9% NACL 0.4MG/10ML
SYRINGE (ML) INTRAVENOUS AS NEEDED
Status: DISCONTINUED | OUTPATIENT
Start: 2024-07-30 | End: 2024-07-30

## 2024-07-30 RX ORDER — DROPERIDOL 2.5 MG/ML
0.62 INJECTION, SOLUTION INTRAMUSCULAR; INTRAVENOUS ONCE AS NEEDED
Status: DISCONTINUED | OUTPATIENT
Start: 2024-07-30 | End: 2024-07-30 | Stop reason: HOSPADM

## 2024-07-30 RX ORDER — GLYCOPYRROLATE 0.2 MG/ML
INJECTION INTRAMUSCULAR; INTRAVENOUS AS NEEDED
Status: DISCONTINUED | OUTPATIENT
Start: 2024-07-30 | End: 2024-07-30

## 2024-07-30 RX ORDER — LIDOCAINE HYDROCHLORIDE 20 MG/ML
INJECTION, SOLUTION INFILTRATION; PERINEURAL AS NEEDED
Status: DISCONTINUED | OUTPATIENT
Start: 2024-07-30 | End: 2024-07-30

## 2024-07-30 RX ORDER — ONDANSETRON HYDROCHLORIDE 2 MG/ML
4 INJECTION, SOLUTION INTRAVENOUS ONCE AS NEEDED
Status: DISCONTINUED | OUTPATIENT
Start: 2024-07-30 | End: 2024-07-30 | Stop reason: HOSPADM

## 2024-07-30 RX ORDER — CEFAZOLIN 1 G/1
INJECTION, POWDER, FOR SOLUTION INTRAVENOUS AS NEEDED
Status: DISCONTINUED | OUTPATIENT
Start: 2024-07-30 | End: 2024-07-30

## 2024-07-30 RX ORDER — LABETALOL HYDROCHLORIDE 5 MG/ML
5 INJECTION, SOLUTION INTRAVENOUS ONCE AS NEEDED
Status: DISCONTINUED | OUTPATIENT
Start: 2024-07-30 | End: 2024-07-30 | Stop reason: HOSPADM

## 2024-07-30 RX ORDER — HYDROCODONE BITARTRATE AND ACETAMINOPHEN 5; 325 MG/1; MG/1
1 TABLET ORAL EVERY 6 HOURS PRN
Qty: 20 TABLET | Refills: 0 | Status: SHIPPED | OUTPATIENT
Start: 2024-07-30

## 2024-07-30 RX ORDER — LIDOCAINE IN NACL,ISO-OSMOT/PF 30 MG/3 ML
0.1 SYRINGE (ML) INJECTION ONCE
Status: DISCONTINUED | OUTPATIENT
Start: 2024-07-30 | End: 2024-07-30 | Stop reason: HOSPADM

## 2024-07-30 RX ORDER — OXYCODONE HYDROCHLORIDE 5 MG/1
5 TABLET ORAL EVERY 4 HOURS PRN
Status: DISCONTINUED | OUTPATIENT
Start: 2024-07-30 | End: 2024-07-30 | Stop reason: HOSPADM

## 2024-07-30 RX ORDER — ALBUTEROL SULFATE 0.83 MG/ML
2.5 SOLUTION RESPIRATORY (INHALATION) ONCE AS NEEDED
Status: DISCONTINUED | OUTPATIENT
Start: 2024-07-30 | End: 2024-07-30 | Stop reason: HOSPADM

## 2024-07-30 RX ORDER — PROPOFOL 10 MG/ML
INJECTION, EMULSION INTRAVENOUS AS NEEDED
Status: DISCONTINUED | OUTPATIENT
Start: 2024-07-30 | End: 2024-07-30

## 2024-07-30 RX ORDER — HYDRALAZINE HYDROCHLORIDE 20 MG/ML
5 INJECTION INTRAMUSCULAR; INTRAVENOUS EVERY 30 MIN PRN
Status: DISCONTINUED | OUTPATIENT
Start: 2024-07-30 | End: 2024-07-30 | Stop reason: HOSPADM

## 2024-07-30 RX ORDER — DIPHENHYDRAMINE HYDROCHLORIDE 50 MG/ML
12.5 INJECTION INTRAMUSCULAR; INTRAVENOUS ONCE AS NEEDED
Status: DISCONTINUED | OUTPATIENT
Start: 2024-07-30 | End: 2024-07-30 | Stop reason: HOSPADM

## 2024-07-30 RX ORDER — OXYCODONE HYDROCHLORIDE 10 MG/1
10 TABLET ORAL EVERY 4 HOURS PRN
Status: DISCONTINUED | OUTPATIENT
Start: 2024-07-30 | End: 2024-07-30 | Stop reason: HOSPADM

## 2024-07-30 RX ORDER — SODIUM CHLORIDE 0.9 G/100ML
IRRIGANT IRRIGATION AS NEEDED
Status: DISCONTINUED | OUTPATIENT
Start: 2024-07-30 | End: 2024-07-30 | Stop reason: HOSPADM

## 2024-07-30 RX ORDER — FENTANYL CITRATE 50 UG/ML
25 INJECTION, SOLUTION INTRAMUSCULAR; INTRAVENOUS EVERY 5 MIN PRN
Status: DISCONTINUED | OUTPATIENT
Start: 2024-07-30 | End: 2024-07-30 | Stop reason: HOSPADM

## 2024-07-30 RX ORDER — APREPITANT 40 MG/1
40 CAPSULE ORAL DAILY
Status: DISCONTINUED | OUTPATIENT
Start: 2024-07-30 | End: 2024-07-30 | Stop reason: HOSPADM

## 2024-07-30 RX ORDER — CHLORHEXIDINE GLUCONATE 40 MG/ML
SOLUTION TOPICAL AS NEEDED
Status: DISCONTINUED | OUTPATIENT
Start: 2024-07-30 | End: 2024-07-30 | Stop reason: HOSPADM

## 2024-07-30 RX ORDER — MIDAZOLAM HYDROCHLORIDE 1 MG/ML
INJECTION, SOLUTION INTRAMUSCULAR; INTRAVENOUS AS NEEDED
Status: DISCONTINUED | OUTPATIENT
Start: 2024-07-30 | End: 2024-07-30

## 2024-07-30 RX ORDER — SODIUM CHLORIDE, SODIUM LACTATE, POTASSIUM CHLORIDE, CALCIUM CHLORIDE 600; 310; 30; 20 MG/100ML; MG/100ML; MG/100ML; MG/100ML
100 INJECTION, SOLUTION INTRAVENOUS CONTINUOUS
Status: DISCONTINUED | OUTPATIENT
Start: 2024-07-30 | End: 2024-07-30 | Stop reason: HOSPADM

## 2024-07-30 RX ORDER — FENTANYL CITRATE 50 UG/ML
50 INJECTION, SOLUTION INTRAMUSCULAR; INTRAVENOUS EVERY 5 MIN PRN
Status: DISCONTINUED | OUTPATIENT
Start: 2024-07-30 | End: 2024-07-30 | Stop reason: HOSPADM

## 2024-07-30 RX ORDER — FENTANYL CITRATE 50 UG/ML
INJECTION, SOLUTION INTRAMUSCULAR; INTRAVENOUS AS NEEDED
Status: DISCONTINUED | OUTPATIENT
Start: 2024-07-30 | End: 2024-07-30

## 2024-07-30 SDOH — HEALTH STABILITY: MENTAL HEALTH: CURRENT SMOKER: 0

## 2024-07-30 ASSESSMENT — PAIN SCALES - GENERAL
PAINLEVEL_OUTOF10: 3
PAINLEVEL_OUTOF10: 0 - NO PAIN
PAINLEVEL_OUTOF10: 0 - NO PAIN
PAINLEVEL_OUTOF10: 3
PAIN_LEVEL: 0
PAINLEVEL_OUTOF10: 6
PAINLEVEL_OUTOF10: 0 - NO PAIN

## 2024-07-30 ASSESSMENT — PAIN - FUNCTIONAL ASSESSMENT
PAIN_FUNCTIONAL_ASSESSMENT: 0-10

## 2024-07-30 ASSESSMENT — PAIN DESCRIPTION - DESCRIPTORS
DESCRIPTORS: ACHING
DESCRIPTORS: NUMBNESS;THROBBING

## 2024-07-30 ASSESSMENT — COLUMBIA-SUICIDE SEVERITY RATING SCALE - C-SSRS
1. IN THE PAST MONTH, HAVE YOU WISHED YOU WERE DEAD OR WISHED YOU COULD GO TO SLEEP AND NOT WAKE UP?: NO
2. HAVE YOU ACTUALLY HAD ANY THOUGHTS OF KILLING YOURSELF?: NO
6. HAVE YOU EVER DONE ANYTHING, STARTED TO DO ANYTHING, OR PREPARED TO DO ANYTHING TO END YOUR LIFE?: NO

## 2024-07-30 NOTE — DISCHARGE INSTRUCTIONS
Follow-Up Instructions    You will need to be seen in clinic in 10-15 days for a post-operative evaluation.  This appointment will be in the outpatient office, not at the Surgery Center.    You will need to call [place] in my office and schedule an appointment, unless there is a previous appointment that appears on your discharge instructions.  Her phone number is [phone].  Please do not delay in calling to make this appointment.      Activity Restrictions    1)  No driving for 24 hours after surgery, due to the anesthetic.    2)  No driving or operating heavy machinery while taking narcotic pain medication.    3)  Weight bearing no more than 10 pounds in the splint.  Light use of the fingers (writing, typing, texting) is good to do.     Discharge Medications    A prescription for a narcotic pain medication (Norco) has been sent to your pharmacy of record.  I do not expect you will need this, but wanted you to have it available as an option if over-the-counter medications are not adequately controlling your pain.  Most people simply take Tylenol, Motrin, Advil, or other anti-inflammatory for the pain.  If you do end up taking the prescription medication, please try to wean yourself off this as quickly as possible.    You can add the prescription medication to the anti-inflammatories if needed, but should not add it to Tylenol, as there is already Tylenol in the prescription.    Wound care instructions:     1)  Leave operative dressing in place until you are seen in the office.  If you shower, cover the hand with a plastic bag and elevate it so the water cannot run down into the bag.    2)  Call if any drainage after 7 days, increased redness/warmth/swelling at incision site, abnormal pain/tenderness of the extremity, abnormal swelling of the extremity that does not respond to elevation, shortness of breath, or chest pain.

## 2024-07-30 NOTE — ANESTHESIA PROCEDURE NOTES
Airway  Date/Time: 7/30/2024 11:12 AM  Urgency: elective    Airway not difficult    Staffing  Performed: DIOGENES   Authorized by: Lori Lehman MD    Performed by: DIOGENES Butler  Patient location during procedure: OR    Indications and Patient Condition  Indications for airway management: anesthesia  Spontaneous Ventilation: absent  Sedation level: deep  Preoxygenated: yes  Patient position: sniffing  Mask difficulty assessment: 1 - vent by mask    Final Airway Details  Final airway type: supraglottic airway      Successful airway: Size 3     Number of attempts at approach: 1    Additional Comments  igel

## 2024-07-30 NOTE — ANESTHESIA PROCEDURE NOTES
Peripheral IV  Date/Time: 7/30/2024 1:44 PM      Placement  Needle size: 24 G  Laterality: left  Location: foot  Site prep: alcohol  Attempts: 3

## 2024-07-30 NOTE — ANESTHESIA POSTPROCEDURE EVALUATION
Patient: Chloé Rojas    Procedure Summary       Date: 07/30/24 Room / Location: [facility] OR 02 / Virtual [facility]    Anesthesia Start: 1107 Anesthesia Stop: 1359    Procedure: Corrective Osteotomy and Plating of Left Distal Radius Malunion (Left: Hand) Diagnosis:       Traumatic closed displaced fracture of distal end of radius, left, with malunion, subsequent encounter      (Traumatic closed displaced fracture of distal end of radius, left, with malunion, subsequent encounter [S52.502P])    Surgeons: Jasmeet Leavitt MD Responsible Provider: Lori Lehman MD    Anesthesia Type: general ASA Status: 3            Anesthesia Type: general    Vitals Value Taken Time   /65 07/30/24 1353   Temp 36.4 °C (97.5 °F) 07/30/24 1353   Pulse 66 07/30/24 1353   Resp 14 07/30/24 1353   SpO2 100 % 07/30/24 1353       Anesthesia Post Evaluation    Patient location during evaluation: PACU  Patient participation: complete - patient cannot participate  Level of consciousness: awake  Pain score: 0  Pain management: adequate  Airway patency: patent  Cardiovascular status: acceptable  Respiratory status: acceptable  Hydration status: acceptable  Postoperative Nausea and Vomiting: none        There were no known notable events for this encounter.

## 2024-07-30 NOTE — H&P
[facility] Department of Orthopaedic Surgery   Surgical History & Physical >30 Days    Reason for Surgery: Left distal radius fracture complicated by malunion   Planned Procedure: Corrective Osteotomy and Plating of Left Distal Radius Malunion - Left     History & Physical Reviewed:  Chloé Rojas is a 53 y.o. female presenting with the above symptoms. This patient was evaluated as an outpatient, and a plan was made for operative management. Risks and benefits were discussed, and the patient and/or caregivers elected to proceed. The patient presents for the above listed procedure today.     Home medications were reviewed with significant updates noted below:  No significant changes.    Allergies:  Allergies   Allergen Reactions    Ketorolac Other     respiratory prob;ems    muscle stiffness    Pantoprazole Sodium Agitation     Shakes, weird dreams, inability to sleep       Review of Systems:  12 point review of systems reviewed and neative     Physical Exam:   · Physical Exam:  - Constitutional: No acute distress, cooperative  - Eyes: EOM grossly intact  - Head/Neck: Trachea midline  - Respiratory/Thorax: Normal work of breathing  - Gastrointestinal: Non tender  - Psychological: Appropriate mood/behavior  - Skin: Warm and dry  - Musculoskeletal: moves extremities    ERAS patient?: No    COVID-19 Risk Consent:   Surgeon has reviewed the key risks related to alex COVID-19 and subsequent sequelae.       07/30/24 at 8:21 AM - Chris Lemons MD

## 2024-07-30 NOTE — BRIEF OP NOTE
Date: 2024  [place] [facility] [place]    Name: Chloé Rojas, : 1970, Age: 53 y.o., MRN: [id number], Sex: female    Diagnosis  Pre-op Diagnosis      * Traumatic closed displaced fracture of distal end of radius, left, with malunion, subsequent encounter [S52.502P] Post-op Diagnosis     * Traumatic closed displaced fracture of distal end of radius, left, with malunion, subsequent encounter [S52.502P]     Procedures  Corrective Osteotomy and Plating of Left Distal Radius Malunion  08522 - MD OSTEOTOMY RADIUS DISTAL THIRD      Surgeons      * Jasmeet Leavitt - Primary    Resident/Fellow/Other Assistant:  Surgeons and Role:  * No surgeons found with a matching role *    Procedure Summary  Anesthesia: General  ASA: III  Anesthesia Staff: Anesthesiologist: Lori Lehman MD  C-AA: DIOGENES Butler  Estimated Blood Loss: 25 mL  Intra-op Medications:   Administrations occurring from 1140 to 1355 on 24:   Medication Name Total Dose   BUPivacaine HCl (Marcaine) 0.5 % (5 mg/mL) 5 mL, lidocaine (Xylocaine) 5 mL syringe 7 mL              Anesthesia Record               Intraprocedure I/O Totals       None           Specimen: No specimens collected     Staff:   Circulator: Rosina Cruzub Person: Tiesha Cruzub Person: Anibal Yang Scrub: Megan Yang Circulator: Jeanine          Findings: please see full operative note    Complications:  None; patient tolerated the procedure well.     Disposition: PACU - hemodynamically stable.  Condition: stable  Specimens Collected: No specimens collected  Attending Attestation: I was present and scrubbed for the entire procedure.    Jasmeet Leavitt  Phone Number: [phone]

## 2024-07-30 NOTE — ANESTHESIA PREPROCEDURE EVALUATION
Patient: Chloé Rojas    Procedure Information       Date/Time: 07/30/24 1140    Procedure: Corrective Osteotomy and Plating of Left Distal Radius Malunion (Left: Hand) - With BLOCK  ;  procedure time will be about 2 hours    [place] [facility] [facility] / [facility] [place]    Surgeons: Jasmeet Leavitt MD            Relevant Problems   Anesthesia (within normal limits)      Cardiac (within normal limits)      Pulmonary (within normal limits)      Neuro   (+) Bipolar disorder, unspecified (Multi)   (+) ARNOLDO (generalized anxiety disorder)   (+) Major depressive disorder, recurrent, unspecified (CMS-HCC)   (+) Seizure cerebral (Multi)      GI   (-) PUD (peptic ulcer disease)      /Renal (within normal limits)      Endocrine (within normal limits)      Musculoskeletal   (+) Cervical spinal stenosis   (+) Fibromyalgia   (+) Spondylosis of cervical spine      GYN   (+) Uterus, adenomyosis       Clinical information reviewed:   Tobacco  Allergies  Meds   Med Hx  Surg Hx  OB Status  Fam Hx  Soc   Hx        NPO Detail:  NPO/Void Status  Date of Last Liquid: 07/29/24  Time of Last Liquid: 0530  Date of Last Solid: 07/29/24  Time of Last Solid: 2000         Physical Exam    Airway  Mallampati: I  TM distance: >3 FB  Neck ROM: full     Cardiovascular - normal exam     Dental   (+) upper dentures, lower dentures     Pulmonary - normal exam     Abdominal - normal exam           Anesthesia Plan    History of general anesthesia?: yes  History of complications of general anesthesia?: no    ASA 3     general     The patient is not a current smoker.    intravenous induction   Postoperative administration of opioids is intended.  Anesthetic plan and risks discussed with patient.    Plan discussed with CAA.

## 2024-07-30 NOTE — OP NOTE
ORTHOPEDIC OPERATIVE NOTE    Name:     Chloé Rojas  :     1970  Facility:    [facility]  Date of Surgery:   2024     PREOP DX:          Left distal radius shortened and extended malunion    POSTOP DX:       Same    PROCEDURE:     Left distal radius corrective osteotomy and volar plating    SURGEON: JR Tree MD    RESIDENT/FELLOW/ASSISTANT:  Chris Lemons MD    ANESTHESIA:    Interscalene block plus general LMA    ESTIMATED BLOOD LOSS :   10 ml    TOTAL FLUIDS:     700 cc LR    SPECIMEN:   None    IMPLANTS:    Synthes variable angle volar distal radius locking plate    TOURNIQUET TIME:  103 minutes at 250 mmHg    COMPLICATIONS:  None    PATIENT RETURNED TO/CONDITION:  PACU in Good      INDICATIONS:      Chloé Rojas is a 53 y.o., right-hand-dominant female who presents with a malunion of a left extra-articular distal radius fracture, quite distal, in 35 degrees extension and mild shortening with significant ulnar positive variance.  This is visually more dramatic given her extremely thin build.  In order to maximize return to function and minimize the cosmetic and functional deformity, she is here for elective osteotomy and volar plating.  I reminded her of surgical risks of infection, scarring, damage to nerves, tendons, or vessels, stiffness, wound healing problems, failure to achieve complete correction, nonunion, malunion, hardware issues, and need for further surgery.  She voiced understanding and wished to proceed with all portions.      NARRATIVE:       Following identification of patient and confirmation of correct site of surgery and signed operative consent, an interscalene block was placed by Anesthesia in preop holding.  She was then brought to the operating room and a hand table affixed to the cart.  A general anesthetic was administered via LMA, along with IV antibiotic dose.  A pneumatic tourniquet was placed high on the left arm, and the limb was prepped from fingertip  to cuff with chlorhexidine, and draped free in the usual sterile fashion.  7 cc of a mix of half percent Marcaine and 1% lidocaine plain was instilled to the planned incision area to supplement the block.  The limb was exsanguinated with an Esmarch, and the tourniquet inflated.    A 6 cm longitudinal volar radial incision was made, roughly over the radial artery, with a zigzag to avoid her ornate tattoo.  Dissection was taken carefully bluntly down under high loupe magnification.  The FCR sheath was incised and the tendon was retracted radially to protect the radial artery.  The underlying FPL tendon and muscle were mobilized free of their distal radial-side attachments and retracted ulnarly to protect the median nerve.  Pronator quadratus was incised longitudinally, leaving a small radial cuff of fascia for repair.  It was elevated subperiosteally, exposing the obvious fracture malunion site.  This was quite distal, as expected.  The fracture line was multiply perforated using a small K wire as a drill.  The perforations were then connected with an osteotome, recapitulating the fracture.  The radial shaft was then progressively pronated and the dorsal periosteum elevated from the copious maturing callus, which was then excised with a rongeur and reserved on the back table as bone graft.  A lamina  was used to regain some radial length.  A Synthes narrow, short stainless steel volar distal radius locking plate was then selected as best fit for her very small bone.  It was provisionally secured to the shaft with K wires and the position optimized under C arm control.  It was then secured with 3 cortical screws in standard sequence.  A reduction maneuver was made and held with 2 provisional K wires through the distal plate.  The distal fragment was then secured with 3 locking screws in a subchondral position.  This brought the dorsal tilt up to about neutral and reduced the ulnar positive variance down to  about 1.5 mm.  This was accepted.  The provisional K wires were removed and the osteotomy gap was densely packed with the organizing callus.  Pronator quadratus was repaired with interrupted 2-0 Vicryl.  The tourniquet was deflated, and pink color rapidly returned to all digits.  Meticulous hemostasis was achieved with bipolar.  After final irrigation, skin was closed with 4-0 Vicryl subcutaneous stitch and running subcuticular 4-0 Monocryl.  Steri-Strips, Xeroform, and soft dressing were applied, followed by a volar and dorsal plaster short arm splint.  The patient was then awakened, extubated, and transferred to Recovery in stable condition.          Electronically signed  JAIMIE Leavitt MD  [phone]

## 2024-07-30 NOTE — ANESTHESIA PROCEDURE NOTES
Peripheral Block    Patient location during procedure: pre-op  Start time: 7/30/2024 10:01 AM  End time: 7/30/2024 10:36 AM  Reason for block: at surgeon's request and post-op pain management  Staffing  Performed: attending   Authorized by: Lori Lehman MD    Performed by: Lori Lehman MD  Preanesthetic Checklist  Completed: patient identified, IV checked, site marked, risks and benefits discussed, surgical consent, monitors and equipment checked, pre-op evaluation and timeout performed   Timeout performed at: 7/30/2024 10:02 AM  Peripheral Block  Patient position: sitting  Prep: ChloraPrep  Patient monitoring: heart rate, cardiac monitor and continuous pulse ox  Block type: brachial plexus  Injection technique: single-shot  Guidance: nerve stimulator and ultrasound guided  Local infiltration: ropivacaine  Infiltration strength: 0.5 %  Dose: 20 mL  Needle  Needle type: short-bevel   Needle gauge: 22 G  Needle length: 5 cm  Needle localization: nerve stimulator and ultrasound guidance  Assessment  Injection assessment: negative aspiration for heme  Heart rate change: no  Slow fractionated injection: yes

## 2024-08-02 DIAGNOSIS — S52.502D TRAUMATIC CLOSED DISPLACED FRACTURE OF DISTAL END OF LEFT RADIUS AND ULNA WITH ROUTINE HEALING, SUBSEQUENT ENCOUNTER: Primary | ICD-10-CM

## 2024-08-02 DIAGNOSIS — S52.602D TRAUMATIC CLOSED DISPLACED FRACTURE OF DISTAL END OF LEFT RADIUS AND ULNA WITH ROUTINE HEALING, SUBSEQUENT ENCOUNTER: Primary | ICD-10-CM

## 2024-08-02 RX ORDER — OXYCODONE AND ACETAMINOPHEN 5; 325 MG/1; MG/1
1 TABLET ORAL EVERY 6 HOURS PRN
Qty: 15 TABLET | Refills: 0 | Status: SHIPPED | OUTPATIENT
Start: 2024-08-02

## 2024-08-13 ENCOUNTER — HOSPITAL ENCOUNTER (OUTPATIENT)
Dept: RADIOLOGY | Facility: CLINIC | Age: 54
Discharge: HOME | End: 2024-08-13
Payer: COMMERCIAL

## 2024-08-13 ENCOUNTER — OFFICE VISIT (OUTPATIENT)
Dept: ORTHOPEDIC SURGERY | Facility: CLINIC | Age: 54
End: 2024-08-13
Payer: COMMERCIAL

## 2024-08-13 DIAGNOSIS — S52.502D TRAUMATIC CLOSED DISPLACED FRACTURE OF DISTAL END OF LEFT RADIUS AND ULNA WITH ROUTINE HEALING, SUBSEQUENT ENCOUNTER: ICD-10-CM

## 2024-08-13 DIAGNOSIS — S52.602D TRAUMATIC CLOSED DISPLACED FRACTURE OF DISTAL END OF LEFT RADIUS AND ULNA WITH ROUTINE HEALING, SUBSEQUENT ENCOUNTER: ICD-10-CM

## 2024-08-13 PROCEDURE — 73110 X-RAY EXAM OF WRIST: CPT | Mod: LT

## 2024-08-13 PROCEDURE — 99211 OFF/OP EST MAY X REQ PHY/QHP: CPT | Mod: 25 | Performed by: ORTHOPAEDIC SURGERY

## 2024-08-13 PROCEDURE — 29075 APPL CST ELBW FNGR SHORT ARM: CPT | Performed by: ORTHOPAEDIC SURGERY

## 2024-08-13 PROCEDURE — 73110 X-RAY EXAM OF WRIST: CPT | Mod: LEFT SIDE | Performed by: RADIOLOGY

## 2024-08-13 NOTE — LETTER
August 15, 2024     No Recipients    Patient: Chloé Rojas   YOB: 1970   Date of Visit: 8/13/2024       Dear Dr. Galvan Recipients:    Thank you for referring Chloé Rojas to me for evaluation. Below are my notes for this consultation.  If you have questions, please do not hesitate to call me. I look forward to following your patient along with you.       Sincerely,     Jasmeet Leavitt MD      CC: No Recipients  ______________________________________________________________________________________    CHIEF COMPLAINT         Left wrist f/u    ASSESSMENT + PLAN    Postop day 14 from left distal radius corrective osteotomy and volar plating    The incision is healing normally.  The x-rays look good.  A fiberglass short arm cast was applied today to keep the area protected.  Keep this clean, dry, and in place for the next 4 weeks.  Follow-up at that point for cast removal with new AP, lateral, oblique x-rays left wrist out of cast at that time.  Work on finger and forearm range of motion is demonstrated.    A prescription for naproxen 500 mg was transmitted to your pharmacy of choice.    Contact my office in the meantime with any interval concerns.        HISTORY OF PRESENT ILLNESS       Patient returns today, as directed, postop day 14 from left distal radius osteotomy and volar plating.  Pain has been decreasing appropriately.  No numbness or tingling.  No new concerns.      PHYSICAL EXAM       Postop splint and dressing removed.  Incision clean, dry, intact with absorbable suture in place.  Clinically well aligned.  No palpable hardware.  Fingers lack about 2 centimeters of the crease in composite flexion, just out of the splint.  Sensation intact to light touch in all distributions.  Capillary refill less than 2 seconds.  2+ radial and ulnar pulses.      IMAGING / LABS / EMGs    X-rays left wrist ordered and independently interpreted by me today show proper hardware position and length and maintenance  of 0 degrees volar tilt, unchanged from the intraoperative views.      Electronically Signed      JAIMIE Leavitt MD      Orthopaedic Hand Surgery      [phone]

## 2024-08-13 NOTE — PROGRESS NOTES
CHIEF COMPLAINT         Left wrist f/u    ASSESSMENT + PLAN    Postop day 14 from left distal radius corrective osteotomy and volar plating    The incision is healing normally.  The x-rays look good.  A fiberglass short arm cast was applied today to keep the area protected.  Keep this clean, dry, and in place for the next 4 weeks.  Follow-up at that point for cast removal with new AP, lateral, oblique x-rays left wrist out of cast at that time.  Work on finger and forearm range of motion is demonstrated.    A prescription for naproxen 500 mg was transmitted to your pharmacy of choice.    Contact my office in the meantime with any interval concerns.        HISTORY OF PRESENT ILLNESS       Patient returns today, as directed, postop day 14 from left distal radius osteotomy and volar plating.  Pain has been decreasing appropriately.  No numbness or tingling.  No new concerns.      PHYSICAL EXAM       Postop splint and dressing removed.  Incision clean, dry, intact with absorbable suture in place.  Clinically well aligned.  No palpable hardware.  Fingers lack about 2 centimeters of the crease in composite flexion, just out of the splint.  Sensation intact to light touch in all distributions.  Capillary refill less than 2 seconds.  2+ radial and ulnar pulses.      IMAGING / LABS / EMGs    X-rays left wrist ordered and independently interpreted by me today show proper hardware position and length and maintenance of 0 degrees volar tilt, unchanged from the intraoperative views.      Electronically Signed      JAIMIE Leavitt MD      Orthopaedic Hand Surgery      [phone]

## 2024-08-15 RX ORDER — NAPROXEN 500 MG/1
500 TABLET ORAL 2 TIMES DAILY
Qty: 60 TABLET | Refills: 0 | Status: SHIPPED | OUTPATIENT
Start: 2024-08-15 | End: 2024-09-14

## 2024-09-10 ENCOUNTER — OFFICE VISIT (OUTPATIENT)
Dept: ORTHOPEDIC SURGERY | Facility: CLINIC | Age: 54
End: 2024-09-10
Payer: COMMERCIAL

## 2024-09-10 ENCOUNTER — HOSPITAL ENCOUNTER (OUTPATIENT)
Dept: RADIOLOGY | Facility: CLINIC | Age: 54
Discharge: HOME | End: 2024-09-10
Payer: COMMERCIAL

## 2024-09-10 DIAGNOSIS — S52.502P: ICD-10-CM

## 2024-09-10 PROCEDURE — 99211 OFF/OP EST MAY X REQ PHY/QHP: CPT | Performed by: ORTHOPAEDIC SURGERY

## 2024-09-10 PROCEDURE — 73110 X-RAY EXAM OF WRIST: CPT | Mod: LEFT SIDE | Performed by: RADIOLOGY

## 2024-09-10 PROCEDURE — 73110 X-RAY EXAM OF WRIST: CPT | Mod: LT

## 2024-09-10 RX ORDER — NAPROXEN 500 MG/1
500 TABLET ORAL 2 TIMES DAILY
Qty: 60 TABLET | Refills: 0 | Status: SHIPPED | OUTPATIENT
Start: 2024-09-10 | End: 2024-10-10

## 2024-09-10 NOTE — PROGRESS NOTES
CHIEF COMPLAINT         Left wrist f/u    ASSESSMENT + PLAN    6 weeks postop from left distal radius osteotomy and plating    The incision has healed.  The cast was removed.  Referral was given for formal Occupational Therapy to work on regaining range of motion.  There are no particular limits on activity at this point.    Follow-up in 4 weeks for clinical check, or certainly sooner with any interval concerns.        HISTORY OF PRESENT ILLNESS       Patient returns today, as directed, 4 weeks after last visit in short arm cast and now 6 weeks postop from left distal radius osteotomy and plating.  Pain has resolved in the cast.  No numbness or tingling.      PHYSICAL EXAM       Cast removed.  Skin intact.  Swelling resolved.  There does appear to be a little dorsal settling which does leave the hand slightly behind the plane of the forearm again.  This is more obvious given her very thin build (BMI-15).  Composite finger flexion lacks about 15 mm of the crease.  Full composite extension.  Tendons intact by individual testing.  Good thumb range of motion.  Wrist and forearm appropriately stiff just out of the cast.  Sensation intact to light touch in all distributions.  Capillary refill less than 2 seconds.  2+ radial and ulnar pulses.      IMAGING / LABS / EMGs    X-rays left wrist ordered and independently interpreted by me today show maintenance of hardware position.  There has been a little interval dorsal settling, but now good callus formation.  She is in about 5 degrees dorsal tilt.  There is still a little dorsal translation of the hand on the forearm, as a consequence.  Joints are concentric and well-preserved.      Electronically Signed      JAIMIE Leavitt MD      Orthopaedic Hand Surgery      [phone]

## 2024-09-10 NOTE — Clinical Note
September 10, 2024       No Recipients    Patient: Chloé Rojas   YOB: 1970   Date of Visit: 9/10/2024       Dear Dr. Galvan Recipients:    Thank you for referring Chloé Rojas to me for evaluation. Below are my notes for this consultation.  If you have questions, please do not hesitate to call me. I look forward to following your patient along with you.       Sincerely,     Jasmeet Leavitt MD      CC:   No Recipients  ______________________________________________________________________________________

## 2024-09-20 ENCOUNTER — DOCUMENTATION (OUTPATIENT)
Dept: OCCUPATIONAL THERAPY | Facility: CLINIC | Age: 54
End: 2024-09-20
Payer: COMMERCIAL

## 2024-09-20 NOTE — PROGRESS NOTES
Occupational Therapy                                                    Therapy Communication Note    Patient Name: Chloé Rojas  MRN: [id number]      Today's Date: 9/20/2024     Discipline: Occupational Therapy      Missed Time: No Show    Comment:  No showed OT evaluation

## 2024-10-17 ENCOUNTER — EVALUATION (OUTPATIENT)
Dept: OCCUPATIONAL THERAPY | Facility: CLINIC | Age: 54
End: 2024-10-17
Payer: COMMERCIAL

## 2024-10-17 DIAGNOSIS — R29.898 DECREASED GRIP STRENGTH OF LEFT HAND: ICD-10-CM

## 2024-10-17 DIAGNOSIS — M25.632 DECREASED RANGE OF MOTION OF LEFT WRIST: Primary | ICD-10-CM

## 2024-10-17 DIAGNOSIS — S52.502P: ICD-10-CM

## 2024-10-17 PROCEDURE — 97110 THERAPEUTIC EXERCISES: CPT | Mod: GO

## 2024-10-17 PROCEDURE — 97165 OT EVAL LOW COMPLEX 30 MIN: CPT | Mod: GO

## 2024-10-17 ASSESSMENT — ENCOUNTER SYMPTOMS
OCCASIONAL FEELINGS OF UNSTEADINESS: 0
DEPRESSION: 1
LOSS OF SENSATION IN FEET: 0

## 2024-10-17 ASSESSMENT — PAIN SCALES - GENERAL: PAINLEVEL_OUTOF10: 5 - MODERATE PAIN

## 2024-10-17 ASSESSMENT — PAIN - FUNCTIONAL ASSESSMENT: PAIN_FUNCTIONAL_ASSESSMENT: 0-10

## 2024-10-17 NOTE — PROGRESS NOTES
Occupational Therapy    Occupational Therapy Evaluation    Name: Chloé Rojas  MRN: [id number]  : 1970  Date: 10/17/24      Time Calculation  Start Time: 0150  Stop Time: 0235  Time Calculation (min): 45 min  OT Evaluation Time Entry  OT Evaluation (Low) Time Entry: 30     OT Therapeutic Procedures Time Entry  Therapeutic Exercise Time Entry: 15              [facility]  Problem List Items Addressed This Visit             ICD-10-CM    Traumatic closed displaced fracture of distal end of radius, left, with malunion, subsequent encounter S52.502P    Relevant Orders    Follow Up In Occupational Therapy    Decreased range of motion of left wrist - Primary M25.632    Relevant Orders    Follow Up In Occupational Therapy    Decreased  strength of left hand R29.898    Relevant Orders    Follow Up In Occupational Therapy     Assessment:   Patient is a 53 year old female  s/p corrective Osteotomy and plating of left distal radius malunion on 24. Patient presents with decreased L wrist ROM and strength impacting patient's abilities to carry out daily activities. Patient would benefit from skilled OT to address the above impairments to maximize patient's functional abilities.     Plan:   OT POC to include: heat/ice, ultrasound manual therapy, therapeutic exercise, therapeutic activity, HEP    1-2x  a week for 6 weeks  Rehab potential: good  Patient in agreement with plan     Subjective   Patient agreeable to OT evaluation. Patient's initial injury occurred after a fall on 24.  Patient is now corrective Osteotomy and plating of left distal radius malunion on 24 due to Left distal radius fracture complicated by malunion. Patient reports constant pain and discomfort in her L wrist. Patient reports wears a splint on her L wrist all day, but takes it off at nighttime.     Current Problem:  1. Decreased range of motion of left wrist  Follow Up In Occupational Therapy      2. Traumatic closed  "displaced fracture of distal end of radius, left, with malunion, subsequent encounter  Referral to Occupational Therapy    Follow Up In Occupational Therapy      3. Decreased  strength of left hand  Follow Up In Occupational Therapy        General:      General  Reason for Referral: OT eval and treat  Referred By:  (Jasmeet Leavitt MD)  Preferred Learning Style: verbal, visual, written  Patient s/p corrective Osteotomy and plating of left distal radius malunion on 7/30/24 due to Left distal radius fracture complicated by malunion.  Precautions:  Precautions  STEADI Fall Risk Score (The score of 4 or more indicates an increased risk of falling): 6  Vital Signs:   Not taken   Pain Assessment:  Pain Assessment  Pain Assessment: 0-10  0-10 (Numeric) Pain Score: 5 - Moderate pain (L wrist constantly)  L wrist pain can get higher with activity (7/10)  Work History:  Not working  Prior Level of Function:  Independent  Roles/Routines:  Has a 3 year old grandson  Patient Goal:  \"I want my movement back\"  Personal Factors that my impact Care:   Surgery was in July; first time coming to therapy    Objective   Palpation:  TTP over volar aspect of L wrist   ROM:  R wrist: WNL  L wrist AROM: wrist extension: 10 degrees; wrist flexion: 5 degrees, supination: 0-40 degree  L wrist ulnarly deviated ~15 degrees, 0 degrees radial deviation   Strength:  R wrist: WNL     R : 65#  LUE: wrist extension: 3/5 , wrist flexion: 3+/5, pronation: 3/5      L : 20#  Coordination:  Digit opposition intact  Sensation:  Denies parathesias   Outcome Measures:  OT Adult Other Outcome Measures  Other Outcome Measures: Quick Dash: (46 /  79.55%)    OP EDUCATION:/Treatment   Educated on modalities (heat vs ice), educated on and completed AROM and gentle PROM stretches to the L wrist. All given in handout form    Goals:  Active       OT Goals       ROM       Start:  10/17/24    Expected End:  12/21/24       Patient will increase L wrist " flexion and extension by at least 10 degrees for ADL/IADL tasks         Pain       Start:  10/17/24    Expected End:  12/21/24       Patient will report an average of 3/10 pain or less in the L wrist          Strength       Start:  10/17/24    Expected End:  12/21/24       Patient will increase L  strength to 35# or greater for ADL/IADL tasks

## 2024-10-21 ENCOUNTER — DOCUMENTATION (OUTPATIENT)
Dept: OCCUPATIONAL THERAPY | Facility: CLINIC | Age: 54
End: 2024-10-21
Payer: COMMERCIAL

## 2024-10-21 NOTE — PROGRESS NOTES
Occupational Therapy                 Therapy Communication Note    Patient Name: Chloé Rojas  MRN: [id number]  Department:   Room: Room/bed info not found  Today's Date: 10/21/2024     Discipline: Occupational Therapy        Missed Time: No Show

## 2024-10-24 ENCOUNTER — DOCUMENTATION (OUTPATIENT)
Dept: OCCUPATIONAL THERAPY | Facility: CLINIC | Age: 54
End: 2024-10-24
Payer: COMMERCIAL

## 2024-10-24 NOTE — PROGRESS NOTES
Occupational Therapy                 Therapy Communication Note    Patient Name: Chloé Rojas  MRN: [id number]  Department:   Room: Room/bed info not found  Today's Date: 10/24/2024     Discipline: Occupational Therapy      Missed Time: No Show

## 2024-10-25 ENCOUNTER — OFFICE VISIT (OUTPATIENT)
Dept: ORTHOPEDIC SURGERY | Facility: HOSPITAL | Age: 54
End: 2024-10-25
Payer: COMMERCIAL

## 2024-10-25 DIAGNOSIS — S52.502P: ICD-10-CM

## 2024-10-25 DIAGNOSIS — M25.632 DECREASED RANGE OF MOTION OF LEFT WRIST: Primary | ICD-10-CM

## 2024-10-25 DIAGNOSIS — R29.898 DECREASED GRIP STRENGTH OF LEFT HAND: ICD-10-CM

## 2024-10-25 PROCEDURE — 99211 OFF/OP EST MAY X REQ PHY/QHP: CPT | Performed by: ORTHOPAEDIC SURGERY

## 2024-10-29 ENCOUNTER — DOCUMENTATION (OUTPATIENT)
Dept: OCCUPATIONAL THERAPY | Facility: CLINIC | Age: 54
End: 2024-10-29
Payer: COMMERCIAL

## 2024-10-31 ENCOUNTER — DOCUMENTATION (OUTPATIENT)
Dept: OCCUPATIONAL THERAPY | Facility: CLINIC | Age: 54
End: 2024-10-31
Payer: COMMERCIAL

## 2024-10-31 ENCOUNTER — APPOINTMENT (OUTPATIENT)
Dept: OCCUPATIONAL THERAPY | Facility: CLINIC | Age: 54
End: 2024-10-31
Payer: COMMERCIAL

## 2024-11-01 ENCOUNTER — DOCUMENTATION (OUTPATIENT)
Dept: OCCUPATIONAL THERAPY | Facility: CLINIC | Age: 54
End: 2024-11-01
Payer: COMMERCIAL

## 2024-11-05 ENCOUNTER — APPOINTMENT (OUTPATIENT)
Dept: OCCUPATIONAL THERAPY | Facility: CLINIC | Age: 54
End: 2024-11-05

## 2024-11-07 ENCOUNTER — APPOINTMENT (OUTPATIENT)
Dept: OCCUPATIONAL THERAPY | Facility: CLINIC | Age: 54
End: 2024-11-07

## 2024-11-08 ENCOUNTER — APPOINTMENT (OUTPATIENT)
Dept: OCCUPATIONAL THERAPY | Facility: HOSPITAL | Age: 54
End: 2024-11-08

## 2024-11-14 ENCOUNTER — EVALUATION (OUTPATIENT)
Dept: OCCUPATIONAL THERAPY | Facility: HOSPITAL | Age: 54
End: 2024-11-14
Payer: COMMERCIAL

## 2024-11-14 DIAGNOSIS — S52.502P: ICD-10-CM

## 2024-11-14 PROCEDURE — 97110 THERAPEUTIC EXERCISES: CPT | Mod: GO | Performed by: OCCUPATIONAL THERAPIST

## 2024-11-14 PROCEDURE — 97140 MANUAL THERAPY 1/> REGIONS: CPT | Mod: GO | Performed by: OCCUPATIONAL THERAPIST

## 2024-11-14 NOTE — PROGRESS NOTES
"Occupational Therapy    Occupational Therapy Progress Note    Name: Chloé Rojas  MRN: [id number]  : 1970  Date: 24       Visit: 2  [facility]  Problem List Items Addressed This Visit             ICD-10-CM    Traumatic closed displaced fracture of distal end of radius, left, with malunion, subsequent encounter S52.502P   Time:  Time Calculation  Start Time: 1515  Stop Time: 1550  Time Calculation (min): 35 min  OT Therapeutic Procedures Time Entry  Manual Therapy Time Entry: 10  Therapeutic Exercise Time Entry: 20      Insurance Type: Payor: [employer] [facility] [place] / Plan: [employer] [place] / Product Type: *No Product type* /     Assessment:  Progressed exercises today. Patient was measured for dynamic mobilization orthosis. Patient to continue working on range of motion exercises.     Plan:   OT POC to include: heat/ice, ultrasound manual therapy, therapeutic exercise, therapeutic activity, HEP    1-2x  a week for 6 weeks  Rehab potential: good  Patient in agreement with plan     Subjective   I can't do anything with my wrist. It hurts all the time. I have been working on my motion but it is barely moving still.    Current Problem:  1. Traumatic closed displaced fracture of distal end of radius, left, with malunion, subsequent encounter  Referral to Occupational Therapy        General:         Patient s/p corrective Osteotomy and plating of left distal radius malunion on 24 due to Left distal radius fracture complicated by malunion.  Precautions:     Vital Signs:   Not taken   Pain Assessment:    (L wrist constantly)  L wrist pain can get higher with activity (7/10)  Work History:  Not working  Prior Level of Function:  Independent  Roles/Routines:  Has a 3 year old grandson  Patient Goal:  \"I want my movement back\"  Personal Factors that my impact Care:   Surgery was in July; first time coming to therapy    Objective   Palpation:  TTP over volar aspect " of L wrist   ROM:  L wrist  Extension/flexion 35 was 10/20 was 5  Forearm pronation/supination 0/55  Strength:  R wrist: WNL     R : 65#  LUE: wrist extension: 3/5 , wrist flexion: 3+/5, pronation: 3/5      L : 20#  Coordination:  Digit opposition intact  Sensation:  Denies parathesias   Outcome Measures:   OT Adult Other Outcome Measures  Other Outcome Measures: Quick Dash: (46 /  79.55%)    OP EDUCATION:/Treatment    Heating pad applied to circumferential hand for 5minutes.  Therapeutic Exercise:                         20 min  HEP education and completion: AAROM wrist extension and flexion on ball, AAROM wrist ulnar and radial deviation on ball, AAROM forearm pronation and supination on ball, tenodesis wrist, active forearm pronation and supination, wrist flexion stretch, and wrist extension stretch.  Therapist measured patient for dynamic mobilization splint.     Manual Therapy                                            10 min  Therapist performed manual  wrist stretches: wrist flexion, wrist extension, wrist ulnar deviation, wrist radial deviation.      Goals:  OT Goal 1  Start date:Today  Expected end date:12/12/2024  Patient-stated:--  Priority:--  Disciplines  OT  Description  Improve wrist flexion to 40 degrees for ADLs.  OT Goal 2  Start date:Today  Expected end date:12/12/2024  Patient-stated:--  Priority:--  Disciplines  OT  Description  Improve forearm pronation to 45 degrees for driving.  OT Goal 3  Start date:Today  Expected end date:01/09/2025  Patient-stated:--  Priority:--  Disciplines  OT  Description  Improve Quick DASH to 15%.  OT Goal 4  Start date:Today  Expected end date:01/09/2025  Patient-stated:--  Priority:--  Disciplines  OT  Description  Report max pain of 2/10 for household chores.  OT Goal 5  Start date:Today  Expected end date:01/09/2025  Patient-stated:--  Priority:--  Disciplines  OT  Description  Improve  strength to 40# for opening jars.

## 2024-12-04 ENCOUNTER — DOCUMENTATION (OUTPATIENT)
Dept: OCCUPATIONAL THERAPY | Facility: HOSPITAL | Age: 54
End: 2024-12-04
Payer: COMMERCIAL

## 2024-12-04 NOTE — PROGRESS NOTES
Occupational Therapy                 Therapy Communication Note    Patient Name: Chloé Rojas  MRN: [id number]  Department:   Room: Room/bed info not found  Today's Date: 12/4/2024     Discipline: Occupational Therapy    Missed Visit Reason:      Missed Time: No Show    Comment: Patient discharged to home program.

## 2024-12-13 ENCOUNTER — OFFICE VISIT (OUTPATIENT)
Dept: ORTHOPEDIC SURGERY | Facility: HOSPITAL | Age: 54
End: 2024-12-13
Payer: COMMERCIAL

## 2024-12-13 DIAGNOSIS — S52.502P: Primary | ICD-10-CM

## 2024-12-13 PROCEDURE — 99211 OFF/OP EST MAY X REQ PHY/QHP: CPT | Performed by: ORTHOPAEDIC SURGERY

## 2024-12-13 RX ORDER — NAPROXEN 500 MG/1
500 TABLET ORAL 2 TIMES DAILY
Qty: 60 TABLET | Refills: 0 | Status: SHIPPED | OUTPATIENT
Start: 2024-12-13 | End: 2025-01-12

## 2024-12-13 NOTE — PROGRESS NOTES
CHIEF COMPLAINT         Left wrist pain    ASSESSMENT + PLAN    Continued stiffness, now 16 weeks postop from osteotomy left distal radius and plating    The bone is healed.  There is certainly a lot more stiffness than neither of us would like to see at this point.  Working with a therapist is going to be essential to improving things from here.  While there are surgical options to improve wrist stiffness, they did not generally work nearly as well as therapy and stretching with the stretching splint.  Encourage you to keep up with the Occupational Therapist this Tuesday to try to get the brace, and then to use it religiously in order to maximize improvement.    A prescription for more naproxen 500 mg was transmitted to your pharmacy of choice.    Follow-up with me in 4 to 6 weeks for clinical check, or certainly sooner with any interval concerns.        HISTORY OF PRESENT ILLNESS       Patient returns today, 6 weeks after last visit and now 16 weeks postop from osteotomy and plating of left wrist with significant stiffness.  Unfortunately she has not yet gotten her stretching brace.  The therapist reported it is ordered.  She is supposed to see them on Tuesday.  No numbness or tingling.  No interval trauma.  Just not a lot of progress.      PHYSICAL EXAM       Incision is well-healed and nontender.  Numbers are about as at the last visit with extension 30 degrees, flexion 20 degrees.  Full composite finger flexion extension.  Good thumb opposition to station 9.  Supination 90 degrees but pronation just to neutral or maybe 5 beyond.  Endpoints are fairly firm.  No mid arc pain or crepitus.  Sensation intact to light touch in all distributions.  Capillary refill less than 2 seconds.      IMAGING / LABS / EMGs    None today      Electronically Signed      JAIMIE Leavitt MD      Orthopaedic Hand Surgery      [phone]

## 2024-12-13 NOTE — Clinical Note
December 16, 2024     No Recipients    Patient: Chloé Rojsa   YOB: 1970   Date of Visit: 12/13/2024       Dear Dr. Galvan Recipients:    Thank you for referring Chloé Rojas to me for evaluation. Below are my notes for this consultation.  If you have questions, please do not hesitate to call me. I look forward to following your patient along with you.       Sincerely,     Jasmeet Leavitt MD      CC: No Recipients  ______________________________________________________________________________________

## 2024-12-17 ENCOUNTER — APPOINTMENT (OUTPATIENT)
Dept: OCCUPATIONAL THERAPY | Facility: HOSPITAL | Age: 54
End: 2024-12-17
Payer: COMMERCIAL

## 2024-12-19 ENCOUNTER — TREATMENT (OUTPATIENT)
Dept: OCCUPATIONAL THERAPY | Facility: HOSPITAL | Age: 54
End: 2024-12-19
Payer: COMMERCIAL

## (undated) DEVICE — DRILL BIT, 1.8MM W/DEPTH MARKER/QC/110MM

## (undated) DEVICE — GLOVE, SURGICAL, PROTEXIS PI BLUE W/NEUTHERA, 7.5, PF, LF

## (undated) DEVICE — GLOVE, SURGICAL, PROTEXIS PI , 7.0, PF, LF

## (undated) DEVICE — SYRINGE, 20 CC, LUER LOCK, MONOJECT, W/O CAP, LF

## (undated) DEVICE — DRESSING, GAUZE, SPONGE, 12 PLY, 4 X 4 IN, PLASTIC POUCH, STRL 10PK

## (undated) DEVICE — SUTURE, VICRYL, 4-0, 18 IN, P-3, UNDYED

## (undated) DEVICE — STRIP, SKIN CLOSURE, STERI STRIP, REINFORCED, 0.5 X 4 IN

## (undated) DEVICE — STOCKINETTE, DOUBLE PLY, 4 X 48 IN, STERILE

## (undated) DEVICE — DRAPE, MINI C-ARM, 54 X 78

## (undated) DEVICE — SUTURE, MONOCRYLIC, 4-0, P3, MONO 18

## (undated) DEVICE — TOWEL, SURGICAL, NEURO, O/R, 16 X 26, BLUE, STERILE

## (undated) DEVICE — BIT, DRILL, QUICK-COUPLING, 2 X 100 MM, STAINLESS STEEL

## (undated) DEVICE — WIRE, KIRSCHNER, TROCAR POINT, 1.25 X 150 MM, STAINLESS STEEL: Type: IMPLANTABLE DEVICE | Status: NON-FUNCTIONAL

## (undated) DEVICE — Device

## (undated) DEVICE — CUFF, TOURNIQUET, SINGLE BLADDER, DUAL PORT, 18 IN, RED, DISPOSABLE